# Patient Record
Sex: MALE | Race: WHITE | NOT HISPANIC OR LATINO | Employment: FULL TIME | URBAN - METROPOLITAN AREA
[De-identification: names, ages, dates, MRNs, and addresses within clinical notes are randomized per-mention and may not be internally consistent; named-entity substitution may affect disease eponyms.]

---

## 2020-01-30 ENCOUNTER — OFFICE VISIT (OUTPATIENT)
Dept: URGENT CARE | Facility: URGENT CARE | Age: 30
End: 2020-01-30

## 2020-01-30 VITALS
OXYGEN SATURATION: 98 % | TEMPERATURE: 98 F | SYSTOLIC BLOOD PRESSURE: 120 MMHG | HEART RATE: 64 BPM | DIASTOLIC BLOOD PRESSURE: 69 MMHG | RESPIRATION RATE: 20 BRPM

## 2020-01-30 DIAGNOSIS — M25.512 ACUTE PAIN OF LEFT SHOULDER: Primary | ICD-10-CM

## 2020-01-30 PROCEDURE — 99203 OFFICE O/P NEW LOW 30 MIN: CPT | Performed by: FAMILY MEDICINE

## 2020-01-30 RX ORDER — HYDROCODONE BITARTRATE AND ACETAMINOPHEN 5; 325 MG/1; MG/1
1 TABLET ORAL EVERY 6 HOURS PRN
Qty: 12 TABLET | Refills: 0 | Status: SHIPPED | OUTPATIENT
Start: 2020-01-30 | End: 2021-05-13

## 2020-01-30 ASSESSMENT — PAIN SCALES - GENERAL: PAINLEVEL: SEVERE PAIN (6)

## 2020-01-30 NOTE — PATIENT INSTRUCTIONS
Okay to take ibuprofen 200 mg - 4 tablets (800 mg) every 8 hours as needed.  Okay to take tylenol 500 mg - 2 tablets (1000 mg) every 6-8 hours as needed, do not exceed 3000 mg in 24 hours.  Okay to take norco sparingly for worse pain.  Ice to area    Follow up with [facility] for further evaluation of shoulder pain      Patient Education     Shoulder Sprain  A sprain is a stretching or tearing of the ligaments that hold a joint together. A sprain may take up to 8 weeks to fully heal, depending on how severe it is. Moderate to severe shoulder sprains are treated with a sling or shoulder immobilizer. Minor sprains can be treated without any special support.  Home care  The following guidelines will help you care for your injury at home:    If a sling was given to you, leave it in place for the time advised by your healthcare provider. If you aren t sure how long to wear it, ask for advice. If the sling becomes loose, adjust it so that your forearm is level with the ground. Your shoulder should feel well supported.    Put an ice pack on the injured area for 20 minutes every 1 to 2 hours the first day. You can make your own ice pack by putting ice cubes in a plastic bag. A bag of frozen peas or something similar works well too. Wrap the bag in a thin towel. Continue with ice packs 3 to 4 times a day for the next 2 to 3 days. Then use the pack as needed to ease pain and swelling.    You may use acetaminophen or ibuprofen to control pain, unless another pain medicine was prescribed. If you have chronic liver or kidney disease, talk with your healthcare provider before using these medicines. Also talk with your provider if you ve had a stomach ulcer or gastrointestinal bleeding.    Shoulder joints become stiff if left in a sling for too long. You should start range of motion exercises about 7 to 10 days after the injury. Talk with your provider to find out what type of exercises to do and how soon to start.  Follow-up  care  Follow up with your healthcare provider, or as advised.  Any X-rays you had today don t show any broken bones, breaks, or fractures. Sometimes fractures don t show up on the first X-ray. Bruises and sprains can sometimes hurt as much as a fracture. These injuries can take time to heal completely. If your symptoms don t improve or they get worse, talk with your provider. You may need a repeat X-ray or other treatments.  When to seek medical advice  Call your healthcare provider right away if any of these occur:    Shoulder pain or swelling in your arm that gets worse    Fingers become cold, blue, numb, or tingly    Large amount of bruising of the shoulder or upper arm    Fever or chills  Date Last Reviewed: 8/1/2016 2000-2019 [employer]. [address] [place] [address], [place] [address]. All rights reserved. This information is not intended as a substitute for professional medical care. Always follow your healthcare professional's instructions.           Patient Education     Understanding Rotator Cuff Tendonitis    Tendons are tough tissues that connect muscles to bone. A group of 4 muscles and their tendons form a  cuff  around the head of the upper arm bone. This is called the rotator cuff. It connects the upper arm to the shoulder blade. It gives the shoulder joint stability and strength.  If tendons are injured or strained, they may become irritated and swollen (inflamed). This is called tendonitis. Rotator cuff tendonitis may cause shoulder pain and loss of function.  What causes rotator cuff tendonitis?  Tendonitis results when the rotator cuff tendons are injured or overworked. The most common cause of injury is repetitive overhead activities. These can be work-related activities such as reaching, pushing, or lifting. Or they can be sports-related activities such as throwing, swimming, or lifting weights.  Symptoms of rotator cuff tendonitis  Pain on the side of the upper arm is the most  common symptom. Pain may get worse with overhead movements or when you raise the arm above shoulder level. It may also hurt to lie on the shoulder at night.  Treatment for rotator cuff tendonitis  Treatment may include the following:    Active rest. This lets the rotator cuff heal. Active rest means using your arm and shoulder, but avoiding activities that cause pain, such as reaching overhead or sleeping on the shoulder.    Cold packs. Putting ice packs on the shoulder helps reduce swelling and relieve pain.    Pain medicines. Prescription or over-the-counter pain medicines can help relieve pain and swelling.    Arm and shoulder exercises. These help keep the shoulder joint mobile as it heals. They also help improve the strength of muscles around the joint.  Possible complications  It might be tempting to stop using your shoulder completely to avoid pain. But doing so may lead to a condition called  frozen shoulder.  To help prevent this, following instructions you are given for active rest and for doing exercises to help your shoulder heal.  When to call your healthcare provider  Call your healthcare provider right away if you have any of these:    Fever of 100.4 F (38 C) or higher, or as directed    Symptoms that don t get better, or get worse    New symptoms   Date Last Reviewed: 3/10/2016    9555-2082 [employer]. [address] [place] [address], [place] [address]. All rights reserved. This information is not intended as a substitute for professional medical care. Always follow your healthcare professional's instructions.

## 2020-01-30 NOTE — LETTER
January 30, 2020      Wellington Wild  [address]    [facility] [address]        To Whom It May Concern:    Wellington Wild  was seen on 1/30.  Please excuse him from work 1/30-2/1 due to injury - left shoulder pain.        Sincerely,        Luis Renteria MD
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
## 2020-07-03 ENCOUNTER — ANCILLARY PROCEDURE (OUTPATIENT)
Dept: GENERAL RADIOLOGY | Facility: CLINIC | Age: 30
End: 2020-07-03
Attending: FAMILY MEDICINE

## 2020-07-03 ENCOUNTER — OFFICE VISIT (OUTPATIENT)
Dept: URGENT CARE | Facility: URGENT CARE | Age: 30
End: 2020-07-03

## 2020-07-03 VITALS
WEIGHT: 208 LBS | TEMPERATURE: 99.2 F | HEART RATE: 104 BPM | BODY MASS INDEX: 24.67 KG/M2 | SYSTOLIC BLOOD PRESSURE: 144 MMHG | DIASTOLIC BLOOD PRESSURE: 83 MMHG | OXYGEN SATURATION: 99 %

## 2020-07-03 DIAGNOSIS — M79.622 PAIN OF LEFT UPPER ARM: Primary | ICD-10-CM

## 2020-07-03 DIAGNOSIS — M79.622 PAIN OF LEFT UPPER ARM: ICD-10-CM

## 2020-07-03 PROCEDURE — 99214 OFFICE O/P EST MOD 30 MIN: CPT | Performed by: FAMILY MEDICINE

## 2020-07-03 PROCEDURE — 73060 X-RAY EXAM OF HUMERUS: CPT | Mod: LT

## 2020-07-03 SDOH — HEALTH STABILITY: MENTAL HEALTH: HOW OFTEN DO YOU HAVE A DRINK CONTAINING ALCOHOL?: 4 OR MORE TIMES A WEEK

## 2020-07-03 NOTE — LETTER
[facility]  [address]  [address]  [place] [address] [phone]      July 3, 2020    RE:  Wellington Wild                                                                                                                                                       [address]    [facility] [address]            To whom it may concern:    Wellington Wild is under my professional care at the [facility] on July 3, 2020.  Because of his severe left upper arm pain, please excuse his absences from work from July 4 to July 6, 2020.     He  may return to work on July 9, 2020, provided that his left arm is feeling better.         Sincerely,        Carlos Aldridge MD    [facility]

## 2020-07-04 NOTE — PATIENT INSTRUCTIONS
Place ice onto the painful areas of the left upper arm for 15 minutes at a time, every 2-3 hours while awake.      Ibuprofen for the pain. (Take with food.)    follow up if not better in 2 weeks.      Rest the left arm as much as possible while at home.
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
## 2021-05-13 ENCOUNTER — ANCILLARY PROCEDURE (OUTPATIENT)
Dept: GENERAL RADIOLOGY | Facility: CLINIC | Age: 31
End: 2021-05-13
Attending: FAMILY MEDICINE
Payer: COMMERCIAL

## 2021-05-13 ENCOUNTER — OFFICE VISIT (OUTPATIENT)
Dept: URGENT CARE | Facility: URGENT CARE | Age: 31
End: 2021-05-13
Payer: COMMERCIAL

## 2021-05-13 VITALS
DIASTOLIC BLOOD PRESSURE: 102 MMHG | TEMPERATURE: 98 F | HEART RATE: 69 BPM | SYSTOLIC BLOOD PRESSURE: 153 MMHG | OXYGEN SATURATION: 98 % | RESPIRATION RATE: 20 BRPM

## 2021-05-13 DIAGNOSIS — R06.02 SOB (SHORTNESS OF BREATH): ICD-10-CM

## 2021-05-13 DIAGNOSIS — F41.9 ANXIETY: ICD-10-CM

## 2021-05-13 DIAGNOSIS — R05.9 COUGH: ICD-10-CM

## 2021-05-13 DIAGNOSIS — R07.9 CHEST PAIN, UNSPECIFIED TYPE: Primary | ICD-10-CM

## 2021-05-13 PROCEDURE — 93000 ELECTROCARDIOGRAM COMPLETE: CPT | Performed by: FAMILY MEDICINE

## 2021-05-13 PROCEDURE — 71046 X-RAY EXAM CHEST 2 VIEWS: CPT | Performed by: RADIOLOGY

## 2021-05-13 PROCEDURE — 99214 OFFICE O/P EST MOD 30 MIN: CPT | Performed by: FAMILY MEDICINE

## 2021-05-13 RX ORDER — CODEINE PHOSPHATE AND GUAIFENESIN 10; 100 MG/5ML; MG/5ML
2 SOLUTION ORAL EVERY 6 HOURS PRN
Qty: 118 ML | Refills: 0 | Status: SHIPPED | OUTPATIENT
Start: 2021-05-13

## 2021-05-13 RX ORDER — BENZONATATE 200 MG/1
200 CAPSULE ORAL 3 TIMES DAILY PRN
Qty: 30 CAPSULE | Refills: 0 | Status: SHIPPED | OUTPATIENT
Start: 2021-05-13

## 2021-05-13 RX ORDER — HYDROXYZINE PAMOATE 25 MG/1
25 CAPSULE ORAL 3 TIMES DAILY PRN
Qty: 30 CAPSULE | Refills: 0 | Status: SHIPPED | OUTPATIENT
Start: 2021-05-13

## 2021-05-13 RX ORDER — ALBUTEROL SULFATE 90 UG/1
2 AEROSOL, METERED RESPIRATORY (INHALATION) EVERY 6 HOURS
Qty: 18 G | Refills: 0 | Status: SHIPPED | OUTPATIENT
Start: 2021-05-13

## 2021-05-13 NOTE — LETTER
May 13, 2021      Wellington Wild  [address]    [place] [address]        To Whom It May Concern:    Wellington Wild  was seen on 5/13.  Please excuse him from work 5/13-5/15 due to illness.        Sincerely,        Luis Renteria MD

## 2021-05-13 NOTE — PROGRESS NOTES
SUBJECTIVE:   Wellington Wild is a 31 year old male presenting with a chief complaint of cough, chest pain.    Patient contracted COVID infection last month, developed symptoms on 4/15 and obtained test a few days later and was positive.  Had other friends with similar symptoms around the same time, not sure how he caught infection.  Patient states that was improving, had quarantine for the require time and overall was feeling 90% better.    Had gone back to work again but earlier this week on Monday 5/10 started to notice more right sided lower chest pain and cough had started to worsen.  Denies any fever.  Denies any trauma or injury to chest area.  The cough has gotten worse, was coughing so much last night that had bloody sputum.  Occurred only once and none afterwards.  Patient had prior palpitations when younger, had holter monitor and this was normal.    This morning was struggling to breath and has noted that over the past few weeks, has been waking up and sometime needs to gasp for breaths.  Had tried to go to work, used supervisor's inhaler and did help with breathing.  Denies any prior history of asthma.    Has been noting more anxiety over the past several years.  Sister with recent diagnosis of anxiety and has started on medication.    No past medical history on file.  Current Outpatient Medications   Medication Sig Dispense Refill     NO ACTIVE MEDICATIONS        Social History     Tobacco Use     Smoking status: Never Smoker     Smokeless tobacco: Never Used   Substance Use Topics     Alcohol use: Yes     Frequency: 4 or more times a week       ROS:  Review of systems negative except as stated above.    OBJECTIVE:  BP (!) 153/102   Pulse 69   Temp 98  F (36.7  C)   Resp 20   SpO2 98%   GENERAL APPEARANCE: healthy, alert and no distress  EYES: EOMI,  PERRL, conjunctiva clear  RESP: lungs clear to auscultation - no rales, rhonchi or wheezes.  No tenderness on right lower chest/ribs  CV: regular  rates and rhythm, normal S1 S2, no murmur noted  SKIN: no rashes  PSYCH: mentation appears normal and affect normal/bright    EKG - sinus rhythm, rate 99, no ST c/w ischemia    CXR - no acute infiltrate, no pleural effusion, no pneumothorax personally viewed by me      ASSESSMENT/PLAN:  (R07.9) Chest pain, unspecified type  (primary encounter diagnosis)  Comment: right sided  Plan: EKG 12-lead complete w/read - Clinics, EKG         12-lead complete w/read - Clinics, XR Chest 2         Views        NSAIDs    (R06.02) SOB (shortness of breath)  Comment: bronchospasm  Plan: XR Chest 2 Views, albuterol (PROAIR         HFA/PROVENTIL HFA/VENTOLIN HFA) 108 (90 Base)         MCG/ACT inhaler            (F41.9) Anxiety  Plan: hydrOXYzine (VISTARIL) 25 MG capsule            (R05) Cough  Plan: benzonatate (TESSALON) 200 MG capsule,         guaiFENesin-codeine (ROBITUSSIN AC) 100-10         MG/5ML solution            Reassurance given, patient appears well, non-toxic appearing and no acute respiratory distress.  Reviewed that some patients have a slower recovery post COVID infection and discussed symptomatic treatment with tylenol, ibuprofen, ice to area of discomfort.  Reassurance given that EKG did not show any concerns for acute cardiac etiology and CXR was negative for pneumonia.  Discussed possible pleurisy as the cause for discomfort.  RX tessalon perles and RX harsh AC given to help with cough.  RX albuterol inhaler given to help with bronchospasm and cough.    Discussed anxiety and cycle of SOB/bronchospasm associated with anxiety.  Encourage slow breaths to help with symptoms, RX vistaril given to see if this will help with anxiety/panic symptoms.  Reviewed importance of follow up with primary provider to address anxiety    Work excuse note given  Encourage to establish with primary provider for follow up in 2 weeks    Luis Renteria MD,  May 13, 2021 6:43 PM

## 2021-05-13 NOTE — PATIENT INSTRUCTIONS
Okay to take ibuprofen 200 mg - 4 tablets (800 mg) every 8 hours as needed.  Okay to take tylenol 500 mg - 2 tablets (1000 mg) every 6-8 hours as needed, do not exceed 3000 mg in 24 hours.  Take tessalon perles to help with cough during the day, take robitussin with codeine to help with cough at bedtime due to drowsiness  Use albuterol inhaler to help with cough and shortness of breath    Take vistaril/hydroxzyine to help with anxiety as needed.      Patient Education     Bronchospasm (Adult)    Bronchospasm occurs when the airways (bronchial tubes) go into spasm and contract. This makes it hard to breathe and causes wheezing (a high-pitched whistling sound). Bronchospasm can also cause frequent coughing without wheezing.  Bronchospasm is due to irritation, inflammation, or allergic reaction of the airways. People with asthma get bronchospasm. However, not everyone with bronchospasm has asthma.  Being exposed to harmful fumes, a recent case of bronchitis, exercise, or a flare-up of chronic obstructive pulmonary disease (COPD) may cause the airways to spasm. An episode of bronchospasm may last 7 to 14 days. Medicine may be prescribed to relax the airways and prevent wheezing. Antibiotics will be prescribed only if your healthcare provider thinks there is a bacterial infection. Antibiotics do not help a viral infection.  Home care    Drink lots of water or other fluids (at least 10 glasses a day) during an attack. This will loosen lung secretions and make it easier to breathe. If you have heart or kidney disease, check with your doctor before you drink extra fluids.    Take prescribed medicine exactly at the times advised. If you take an inhaled medicine to help with breathing, don't use it more than once every 4 hours, unless told to do so. If prescribed an antibiotic or prednisone, take all of the medicine, even if you are feeling better after a few days.    Don't smoke. Also avoid being exposed to secondhand  smoke.    If you were given an inhaler, use it exactly as directed. If you need to use it more often than prescribed, your condition may be getting worse. Contact your healthcare provider.  Follow-up care  Follow up with your healthcare provider, or as advised.  If you are age 65 or older, have a chronic lung disease or condition that affects your immune system, or you smoke, ask your healthcare provider about getting a pneumococcal vaccine, as well as a yearly flu shot (influenza vaccine).  When to seek medical advice  Call your healthcare provider right away if any of these occur:    You need to use your inhalers more often than usual    Fever of 100.4 F (38 C) or higher, or as directed by your healthcare provider    Cough that brings up lots of dark-colored sputum (mucus)    You don't get better within 24 hours  Call 911  Call 911 if any of these occur:    Coughing up bloody sputum (mucus)    Chest pain with each breath    Increased wheezing or shortness of breath  [employer] last reviewed this educational content on 6/1/2018 2000-2021 [employer]. All rights reserved. This information is not intended as a substitute for professional medical care. Always follow your healthcare professional's instructions.           Patient Education     Your Body s Response to Anxiety  Normal anxiety is part of the body s natural defense system. It's an alert to a threat that is unknown, vague, or comes from your own internal fears. While you re in this state, your feelings can range from a vague sense of worry to physical sensations such as a pounding heartbeat. These feelings make you want to react to the threat. An anxiety response is normal in many situations. But when you have an anxiety disorder, the same response can occur at the wrong times.   Anxiety can be helpful  Normal anxiety is a signal from your brain. It warns you of a threat. It's a normal response to help you prevent something. Or to decrease the  bad effects of something you can't control. For example, anxiety is a normal response to situations that might harm your body, separate you from a loved one, or lose your job. The symptoms of anxiety can be physical and mental.   How does it feel?  People with anxiety may have:    Dizziness    Muscle tension or pain    Restlessness    Sleeplessness    Trouble focusing    Racing heartbeat    Fast breathing    Shaking or trembling    Stomachache    Diarrhea    Loss of energy    Sweating    Cold, clammy hands    Chest pain    Dry mouth  Anxiety can also be a problem  Anxiety can become a problem when it is hard to control, occurs for months, and interferes with important parts of your life. With an anxiety disorder, your body has the response described above, but in inappropriate ways. The response a person has depends on the anxiety disorder he or she has. With some disorders, the anxiety is way out of proportion to the threat that triggers it. With others, anxiety may occur even when there isn t a clear threat or trigger.   Who does it affect?  Some people are more likely to have lasting anxiety than others. It tends to run in families. And it affects more younger people than older people, and more women than men. But no age, race, or gender is immune to anxiety problems.   Anxiety can be treated  The good news is that the anxiety that s disrupting your life can be treated. Check with your healthcare provider and rule out any physical problems that may be causing the anxiety symptoms. If an anxiety disorder is diagnosed, seek mental healthcare. This is an illness and it can respond to treatment. Most types of anxiety disorders will respond to talk therapy (counseling) and medicines. Working with your doctor or other healthcare provider, you can develop skills to help you cope with anxiety. You can also gain the perspective you need to overcome your fears. Good sources of support or guidance can be found at your local  hospital, mental health clinic, or an employee assistance program.     How to cope with anxiety  Here are some things you can do to cope:    Do what you can.  Keep in mind that you can t control everything. Change what you can. And let the rest take its course.    Exercise. This is a great way to ease tension and help your body feel relaxed.    Stay away from caffeine and nicotine.  These can make anxiety symptoms worse.    Stay sober.  Don't use alcohol or unprescribed medicines. They only make things worse in the long run.    Learn more about anxiety disorders.  Keep track of helpful online resources and books you can use during stressful periods.    Try stress management. Try methods such as meditation.    Talk with others. Think about joining online or in-person support groups.    Get help. Find professional mental health services if your symptoms can't be managed or reduced with the above methods.  [employer] last reviewed this educational content on 4/1/2020 2000-2021 [employer]. All rights reserved. This information is not intended as a substitute for professional medical care. Always follow your healthcare professional's instructions.           Patient Education     Panic Attack  A panic attack is an extreme fear reaction that comes on for no clear reason. There is often a fear that something terrible will happen or that you may die. The attack may last a few minutes up to a few hours. Between attacks, things will seem quite normal. This condition has a psychological cause and can be treated with the help of a therapist or psychiatrist. Medicine can be very helpful for this problem.  Panic attacks usually come on suddenly, reaches a peak within minutes, and includes at least 4 of these symptoms:    Palpitations, pounding heart, or accelerated heart rate    Sweating    Chills or heat sensations    Trembling or shaking    Sensations of shortness of breath or smothering    Feelings of  choking    Chest pain or discomfort    Nausea or abdominal distress    Feeling dizzy, unsteady, light-headed, or faint    Numbness or tingling sensations    Fear of dying    Fear of going crazy or of losing control    Feelings of unreality, strangeness, or detachment from the environment  Many of these symptoms can be linked to physical problems, so it is sometimes necessary to rule out conditions like thyroid disorders, heart disease, gastrointestinal problems, and others. They can also start as physical symptoms, but psychologically we may react to them in a fearful way, worsening the way we react and feel.  Home care    Try to find the sources of stress in your life. They may not be obvious. These may include:  ? Daily hassles of life which pile up (traffic jams, missed appointments, car troubles).  ? Major life changes, both good (new baby, job promotion) and bad (loss of job, loss of loved one).  ? Feeling that you have too many responsibilities and can't take care of everything at once.  ? Helplessness: feeling like your problems are too much for you to handle.    Notice how your body reacts to stress. Learn to listen to your body signals so that you can take action before the stress becomes severe.    Try to be aware of what you were doing before the reaction started; this may give you clues to things that can trigger a reaction. It may be situations in your life, or what you were doing at the time.    When possible, avoid or reduce the cause of stress. Avoid hassles, limit the amount of change that is happening in your life at one time or take a break when you feel overloaded.    Unfortunately, you can't stay away from many stressful situations. So you need to learn how to manage stress better. Many proven methods will reduce your anxiety. These include simple things like exercise, good nutrition, and adequate rest. Also, there are certain techniques that are helpful: relaxation and breathing exercises,  visualization, biofeedback, meditation, or simply taking time-out to clear your mind. For more information about this, ask your doctor or go to a local bookstore and review the many books and tapes available on this subject.  Follow-up care  Follow-up with your healthcare provider, or as advised.  Call 911  Call 911 if you:    Have suicidal thoughts, a suicide plan, and the means to carry out the plan    Have serious thoughts of hurting someone else    Have trouble breathing    Are very confused    Feel very drowsy or have trouble awakening    Faint or lose consciousness    Have new chest pain that becomes more severe, lasts longer, or spreads into your shoulder, arm, neck, jaw, or back    Have a very rapid or irregular heartbeat    Have a seizure  When to seek medical advice  Call your healthcare provider right away if any of these occur:    Worsening of your symptoms to the point of feeling out-of-control    Feeling that you may try to harm yourself or another    Can't sleep or eat for 3 days in a row    Increased pain with breathing    Increasing feeling of weakness or dizziness    Cough with dark colored sputum (phlegm) or blood    Fever of 100.4 F (38 C) or higher, or as directed by your healthcare provider    Swelling, pain, or redness in one leg    Requests by family or friends for you to seek help for your symptoms  [employer] last reviewed this educational content on 3/1/2018    0396-1475 [employer]. All rights reserved. This information is not intended as a substitute for professional medical care. Always follow your healthcare professional's instructions.

## 2022-09-17 ENCOUNTER — OFFICE VISIT (OUTPATIENT)
Dept: URGENT CARE | Facility: URGENT CARE | Age: 32
End: 2022-09-17

## 2022-09-17 VITALS
HEART RATE: 64 BPM | TEMPERATURE: 98.3 F | RESPIRATION RATE: 16 BRPM | SYSTOLIC BLOOD PRESSURE: 120 MMHG | DIASTOLIC BLOOD PRESSURE: 76 MMHG | WEIGHT: 206.5 LBS | OXYGEN SATURATION: 97 %

## 2022-09-17 DIAGNOSIS — S99.922A FOOT INJURY, LEFT, INITIAL ENCOUNTER: Primary | ICD-10-CM

## 2022-09-17 DIAGNOSIS — S93.602A FOOT SPRAIN, LEFT, INITIAL ENCOUNTER: ICD-10-CM

## 2022-09-17 PROCEDURE — 99213 OFFICE O/P EST LOW 20 MIN: CPT | Performed by: FAMILY MEDICINE

## 2022-09-17 NOTE — PROGRESS NOTES
SUBJECTIVE:  Chief Complaint   Patient presents with     Foot Injury     6 days, golfing and someone stepped on left foot, bruised, painful to walk     Wellington Wild is a 32 year old male presents with a chief complaint of left foot pain .  The injury occurred 6 day(s) ago (9/12 around 6pm).   The injury happened while indoors. How: friend fell and landed on his left foot.  The patient complained of moderate pain  and has had decreased ROM.  Had tried to rest and ice it after injury.  Tried to work, on his feet and pain was exacerbated by weight-bearing and movement.  He treated it initially with ice, Tylenol and Ibuprofen. This is the first time this type of injury has occurred to this patient.     No past medical history on file.  Current Outpatient Medications   Medication Sig Dispense Refill     albuterol (PROAIR HFA/PROVENTIL HFA/VENTOLIN HFA) 108 (90 Base) MCG/ACT inhaler Inhale 2 puffs into the lungs every 6 hours (Patient not taking: Reported on 9/17/2022) 18 g 0     benzonatate (TESSALON) 200 MG capsule Take 1 capsule (200 mg) by mouth 3 times daily as needed for cough (Patient not taking: Reported on 9/17/2022) 30 capsule 0     guaiFENesin-codeine (ROBITUSSIN AC) 100-10 MG/5ML solution Take 10 mLs by mouth every 6 hours as needed for cough (Patient not taking: Reported on 9/17/2022) 118 mL 0     hydrOXYzine (VISTARIL) 25 MG capsule Take 1 capsule (25 mg) by mouth 3 times daily as needed for anxiety (Patient not taking: Reported on 9/17/2022) 30 capsule 0     NO ACTIVE MEDICATIONS  (Patient not taking: Reported on 9/17/2022)       Social History     Tobacco Use     Smoking status: Never Smoker     Smokeless tobacco: Never Used   Substance Use Topics     Alcohol use: Yes       ROS:  Review of systems negative except as stated above.    EXAM:   /76 (BP Location: Right arm, Patient Position: Sitting, Cuff Size: Adult Regular)   Pulse 64   Temp 98.3  F (36.8  C) (Tympanic)   Resp 16   Wt 93.7 kg  (206 lb 8 oz)   SpO2 97%   Gen: healthy,alert,no distress  Extremity: left foot has tenderness on big toe and anterior generalized metarsals, faint bruising.   There is not compromise to the distal circulation.  Pulses are +2 and CRT is brisk  PSYCH:alert, affect bright    X-RAY was done - left foot - no acute fracture    ASSESSMENT/PLAN:   (N17.984I) Foot injury, left, initial encounter  (primary encounter diagnosis)  Plan: XR Foot Left G/E 3 Views, Crutches Order for         DME - ONLY FOR DME, Ankle/Foot Bracing Supplies        Order for DME - ONLY FOR DME            (Q17.684H) Foot sprain, left, initial encounter  Plan: Ankle/Foot Bracing Supplies Order for DME -         ONLY FOR DME            Reassurance given, reviewed that injury most likely bone bruise/sprain and discussed symptomatic treatment with tylenol, ibuprofen, rest, ice, elevation.  DME for crutches and DME post op shoe given for support.  Will follow up on formal xray report and notify if any abnormalities    Work excuse note given  Follow up with primary provider if no improvement of symptoms in 1 week    Luis Renteria MD  September 17, 2022 7:20 PM

## 2022-09-17 NOTE — LETTER
September 17, 2022      Wellington Wild  [address]   [place] [address]        To Whom It May Concern:    Wellington Wild  was seen on 9/17.  Please excuse him from work 9/18 due to foot injury.        Sincerely,        Luis Renteria MD

## 2022-09-18 NOTE — PATIENT INSTRUCTIONS
Okay to take ibuprofen 200 mg - 4 tablets (800 mg) every 8 hours as needed.  Okay to take tylenol 500 mg - 2 tablets (1000 mg) every 6-8 hours as needed, do not exceed 3000 mg in 24 hours.  Rest, ice  Use crutches for non weight bearing until symptoms improve  Use post op shoe for support

## 2022-10-22 ENCOUNTER — HEALTH MAINTENANCE LETTER (OUTPATIENT)
Age: 32
End: 2022-10-22

## 2023-11-05 ENCOUNTER — HEALTH MAINTENANCE LETTER (OUTPATIENT)
Age: 33
End: 2023-11-05

## 2024-12-22 ENCOUNTER — HEALTH MAINTENANCE LETTER (OUTPATIENT)
Age: 34
End: 2024-12-22